# Patient Record
Sex: FEMALE | Race: WHITE | NOT HISPANIC OR LATINO | URBAN - NONMETROPOLITAN AREA
[De-identification: names, ages, dates, MRNs, and addresses within clinical notes are randomized per-mention and may not be internally consistent; named-entity substitution may affect disease eponyms.]

---

## 2017-01-12 ENCOUNTER — OFFICE VISIT (OUTPATIENT)
Dept: OBSTETRICS AND GYNECOLOGY | Facility: CLINIC | Age: 22
End: 2017-01-12

## 2017-01-12 VITALS
SYSTOLIC BLOOD PRESSURE: 112 MMHG | WEIGHT: 129 LBS | DIASTOLIC BLOOD PRESSURE: 60 MMHG | HEIGHT: 61 IN | BODY MASS INDEX: 24.35 KG/M2

## 2017-01-12 DIAGNOSIS — N94.6 DYSMENORRHEA: ICD-10-CM

## 2017-01-12 DIAGNOSIS — N92.0 MENORRHAGIA WITH REGULAR CYCLE: Primary | ICD-10-CM

## 2017-01-12 PROCEDURE — 99204 OFFICE O/P NEW MOD 45 MIN: CPT | Performed by: OBSTETRICS & GYNECOLOGY

## 2017-01-12 RX ORDER — NORETHINDRONE ACETATE AND ETHINYL ESTRADIOL AND FERROUS FUMARATE 1MG-20(24)
1 KIT ORAL DAILY
Qty: 28 TABLET | Refills: 11 | Status: SHIPPED | OUTPATIENT
Start: 2017-01-12 | End: 2017-01-13 | Stop reason: SDUPTHER

## 2017-01-12 RX ORDER — NAPROXEN SODIUM 275 MG/1
275 TABLET ORAL 2 TIMES DAILY WITH MEALS
Qty: 60 TABLET | Refills: 2 | Status: SHIPPED | OUTPATIENT
Start: 2017-01-12 | End: 2017-02-01

## 2017-01-12 RX ORDER — IBUPROFEN 600 MG/1
600 TABLET ORAL EVERY 6 HOURS PRN
Status: DISCONTINUED | OUTPATIENT
Start: 2017-01-12 | End: 2017-02-01

## 2017-01-12 RX ORDER — IBUPROFEN 600 MG/1
TABLET ORAL
Refills: 4 | COMMUNITY
Start: 2016-10-31 | End: 2017-01-13 | Stop reason: SDUPTHER

## 2017-01-12 RX ORDER — NAPROXEN 250 MG/1
TABLET ORAL
Refills: 3 | COMMUNITY
Start: 2016-12-13

## 2017-01-12 NOTE — MR AVS SNAPSHOT
Lyudmilaamor Warner   1/12/2017 1:30 PM   Office Visit    Dept Phone:  [phone]   Encounter #:  [id number]    Provider:  Nilsa Capellan MD   Department:  [facility] OBSTETRICS AND GYNECOLOGY                Your Full Care Plan              Today's Medication Changes          These changes are accurate as of: 1/12/17  2:24 PM.  If you have any questions, ask your nurse or doctor.               New Medication(s)Ordered:     naproxen sodium 275 MG tablet   Commonly known as:  ANAPROX   Take 1 tablet by mouth 2 (Two) Times a Day With Meals.   Started by:  Nilsa Capellan MD       Norethin Ace-Eth Estrad-FE 1-20 MG-MCG(24) chewable tablet   Chew 1 tablet Daily.   Started by:  Nilsa Capellan MD            Where to Get Your Medications      These medications were sent to [place], [place] - [address] [place] [address] Rd. - [phone]  - [phone]   [address] [place] [address] [place], [facility] [address]     Phone:  [phone]     naproxen sodium 275 MG tablet    Norethin Ace-Eth Estrad-FE 1-20 MG-MCG(24) chewable tablet                  Your Updated Medication List          This list is accurate as of: 1/12/17  2:24 PM.  Always use your most recent med list.                ibuprofen 600 MG tablet   Commonly known as:  ADVIL,MOTRIN       naproxen 250 MG tablet   Commonly known as:  NAPROSYN       naproxen sodium 275 MG tablet   Commonly known as:  ANAPROX   Take 1 tablet by mouth 2 (Two) Times a Day With Meals.       Norethin Ace-Eth Estrad-FE 1-20 MG-MCG(24) chewable tablet   Chew 1 tablet Daily.       SPRINTEC 28 0.25-35 MG-MCG per tablet   Generic drug:  norgestimate-ethinyl estradiol               You Were Diagnosed With        Codes Comments    Menorrhagia with regular cycle    -  Primary ICD-10-CM: N92.0  ICD-9-CM: 626.2     Dysmenorrhea     ICD-10-CM: N94.6  ICD-9-CM: 625.3       Medications to be Given to You by a Medical Professional     Due       Frequency    (none)  ibuprofen (ADVIL,MOTRIN) tablet 600 mg  Every 6 Hours PRN      Instructions    Menorrhagia  Menorrhagia is the medical term for when your menstrual periods are heavy or last longer than usual. With menorrhagia, every period you have may cause enough blood loss and cramping that you are unable to maintain your usual activities.  CAUSES   In some cases, the cause of heavy periods is unknown, but a number of conditions may cause menorrhagia. Common causes include:  · A problem with the hormone-producing thyroid gland (hypothyroid).  · Noncancerous growths in the uterus (polyps or fibroids).  · An imbalance of the estrogen and progesterone hormones.  · One of your ovaries not releasing an egg during one or more months.  · Side effects of having an intrauterine device (IUD).  · Side effects of some medicines, such as anti-inflammatory medicines or blood thinners.  · A bleeding disorder that stops your blood from clotting normally.  SIGNS AND SYMPTOMS   During a normal period, bleeding lasts between 4 and 8 days. Signs that your periods are too heavy include:  · You routinely have to change your pad or tampon every 1 or 2 hours because it is completely soaked.  · You pass blood clots larger than 1 inch (2.5 cm) in size.  · You have bleeding for more than 7 days.  · You need to use pads and tampons at the same time because of heavy bleeding.  · You need to wake up to change your pads or tampons during the night.  · You have symptoms of anemia, such as tiredness, fatigue, or shortness of breath.   DIAGNOSIS   Your health care provider will perform a physical exam and ask you questions about your symptoms and menstrual history. Other tests may be ordered based on what the health care provider finds during the exam. These tests can include:  · Blood tests. Blood tests are used to check if you are pregnant or have hormonal changes, a bleeding or thyroid disorder, low iron levels (anemia), or other problems.  · Endometrial  biopsy. Your health care provider takes a sample of tissue from the inside of your uterus to be examined under a microscope.  · Pelvic ultrasound. This test uses sound waves to make a picture of your uterus, ovaries, and vagina. The pictures can show if you have fibroids or other growths.  · Hysteroscopy. For this test, your health care provider will use a small telescope to look inside your uterus.  Based on the results of your initial tests, your health care provider may recommend further testing.  TREATMENT   Treatment may not be needed. If it is needed, your health care provider may recommend treatment with one or more medicines first. If these do not reduce bleeding enough, a surgical treatment might be an option. The best treatment for you will depend on:   · Whether you need to prevent pregnancy.    · Your desire to have children in the future.  · The cause and severity of your bleeding.  · Your opinion and personal preference.    Medicines for menorrhagia may include:  · Birth control methods that use hormones. These include the pill, skin patch, vaginal ring, shots that you get every 3 months, hormonal IUD, and implant. These treatments reduce bleeding during your menstrual period.  · Medicines that thicken blood and slow bleeding.  · Medicines that reduce swelling, such as ibuprofen.   · Medicines that contain a synthetic hormone called progestin.    · Medicines that make the ovaries stop working for a short time.    You may need surgical treatment for menorrhagia if the medicines are unsuccessful. Treatment options include:  · Dilation and curettage (D&C). In this procedure, your health care provider opens (dilates) your cervix and then scrapes or suctions tissue from the lining of your uterus to reduce menstrual bleeding.  · Operative hysteroscopy. This procedure uses a tiny tube with a light (hysteroscope) to view your uterine cavity and can help in the surgical removal of a polyp that may be causing  heavy periods.  · Endometrial ablation. Through various techniques, your health care provider permanently destroys the entire lining of your uterus (endometrium). After endometrial ablation, most women have little or no menstrual flow. Endometrial ablation reduces your ability to become pregnant.  · Endometrial resection. This surgical procedure uses an electrosurgical wire loop to remove the lining of the uterus. This procedure also reduces your ability to become pregnant.  · Hysterectomy. Surgical removal of the uterus and cervix is a permanent procedure that stops menstrual periods. Pregnancy is not possible after a hysterectomy. This procedure requires anesthesia and hospitalization.  HOME CARE INSTRUCTIONS   · Only take over-the-counter or prescription medicines as directed by your health care provider. Take prescribed medicines exactly as directed. Do not change or switch medicines without consulting your health care provider.  · Take any prescribed iron pills exactly as directed by your health care provider. Long-term heavy bleeding may result in low iron levels. Iron pills help replace the iron your body lost from heavy bleeding. Iron may cause constipation. If this becomes a problem, increase the bran, fruits, and roughage in your diet.  · Do not take aspirin or medicines that contain aspirin 1 week before or during your menstrual period. Aspirin may make the bleeding worse.  · If you need to change your sanitary pad or tampon more than once every 2 hours, stay in bed and rest as much as possible until the bleeding stops.  · Eat well-balanced meals. Eat foods high in iron. Examples are leafy green vegetables, meat, liver, eggs, and whole grain breads and cereals. Do not try to lose weight until the abnormal bleeding has stopped and your blood iron level is back to normal.  SEEK MEDICAL CARE IF:   · You soak through a pad or tampon every 1 or 2 hours, and this happens every time you have a period.  · You  need to use pads and tampons at the same time because you are bleeding so much.  · You need to change your pad or tampon during the night.  · You have a period that lasts for more than 8 days.  · You pass clots bigger than 1 inch wide.  · You have irregular periods that happen more or less often than once a month.  · You feel dizzy or faint.  · You feel very weak or tired.  · You feel short of breath or feel your heart is beating too fast when you exercise.  · You have nausea and vomiting or diarrhea while you are taking your medicine.  · You have any problems that may be related to the medicine you are taking.  SEEK IMMEDIATE MEDICAL CARE IF:   · You soak through 4 or more pads or tampons in 2 hours.  · You have any bleeding while you are pregnant.  MAKE SURE YOU:   · Understand these instructions.  · Will watch your condition.  · Will get help right away if you are not doing well or get worse.     This information is not intended to replace advice given to you by your health care provider. Make sure you discuss any questions you have with your health care provider.     Document Released: 2006 Document Revised: 2014 Document Reviewed: 2014  [employer] Interactive Patient Education ©6 [employer].       Patient Instructions History      Upcoming Appointments     Visit Type Date Time Department    ANNUAL 2017  1:30 PM [facility]    NEW PATIENT 2017 10:30 AM [facility]    GYN FOLLOW UP 2017  2:00 PM [facility]      [employer] Signup     [facility] [employer] allows you to send messages to your doctor, view your test results, renew your prescriptions, schedule appointments, and more. To sign up, go to [employer] and click on the Sign Up Now link in the New User? box. Enter your [employer] Activation Code exactly as it appears below along with the last four digits of your Social Security Number and your Date of Birth () to complete the sign-up process.  "If you do not sign up before the expiration date, you must request a new code.    [email or web address] Activation Code: [email or web address]  Expires: 1/26/2017  2:24 PM    If you have questions, you can email [email or web address] or call [phone] to talk to our [employer] staff. Remember, [employer] is NOT to be used for urgent needs. For medical emergencies, dial 911.               Other Info from Your Visit           Your Appointments     Feb 01, 2017 10:30 AM EST   New Patient with Filippo Blevins MD   [facility] FAMILY MEDICINE (--)    [address]  [facility] [address] [phone]           Bring all previous medical records and films, along with current medications and insurance information.            May 12, 2017  2:00 PM EDT   GYN FOLLOW UP with Nilsa Capellan MD   [facility] OBSTETRICS AND GYNECOLOGY (--)    [address]  [facility] [address] [phone]              Allergies     Cefzil [Cefprozil]      Other      omnicef      Reason for Visit     Contraception           Vital Signs     Blood Pressure Height Weight Last Menstrual Period Breastfeeding? Body Mass Index    112/60 61\" (154.9 cm) 129 lb (58.5 kg) 01/04/2017 No 24.37 kg/m2      Problems and Diagnoses Noted     Menorrhagia with regular cycle    -  Primary    Painful menstruation            "

## 2017-01-12 NOTE — PATIENT INSTRUCTIONS
Menorrhagia  Menorrhagia is the medical term for when your menstrual periods are heavy or last longer than usual. With menorrhagia, every period you have may cause enough blood loss and cramping that you are unable to maintain your usual activities.  CAUSES   In some cases, the cause of heavy periods is unknown, but a number of conditions may cause menorrhagia. Common causes include:  · A problem with the hormone-producing thyroid gland (hypothyroid).  · Noncancerous growths in the uterus (polyps or fibroids).  · An imbalance of the estrogen and progesterone hormones.  · One of your ovaries not releasing an egg during one or more months.  · Side effects of having an intrauterine device (IUD).  · Side effects of some medicines, such as anti-inflammatory medicines or blood thinners.  · A bleeding disorder that stops your blood from clotting normally.  SIGNS AND SYMPTOMS   During a normal period, bleeding lasts between 4 and 8 days. Signs that your periods are too heavy include:  · You routinely have to change your pad or tampon every 1 or 2 hours because it is completely soaked.  · You pass blood clots larger than 1 inch (2.5 cm) in size.  · You have bleeding for more than 7 days.  · You need to use pads and tampons at the same time because of heavy bleeding.  · You need to wake up to change your pads or tampons during the night.  · You have symptoms of anemia, such as tiredness, fatigue, or shortness of breath.   DIAGNOSIS   Your health care provider will perform a physical exam and ask you questions about your symptoms and menstrual history. Other tests may be ordered based on what the health care provider finds during the exam. These tests can include:  · Blood tests. Blood tests are used to check if you are pregnant or have hormonal changes, a bleeding or thyroid disorder, low iron levels (anemia), or other problems.  · Endometrial biopsy. Your health care provider takes a sample of tissue from the inside of your  uterus to be examined under a microscope.  · Pelvic ultrasound. This test uses sound waves to make a picture of your uterus, ovaries, and vagina. The pictures can show if you have fibroids or other growths.  · Hysteroscopy. For this test, your health care provider will use a small telescope to look inside your uterus.  Based on the results of your initial tests, your health care provider may recommend further testing.  TREATMENT   Treatment may not be needed. If it is needed, your health care provider may recommend treatment with one or more medicines first. If these do not reduce bleeding enough, a surgical treatment might be an option. The best treatment for you will depend on:   · Whether you need to prevent pregnancy.    · Your desire to have children in the future.  · The cause and severity of your bleeding.  · Your opinion and personal preference.    Medicines for menorrhagia may include:  · Birth control methods that use hormones. These include the pill, skin patch, vaginal ring, shots that you get every 3 months, hormonal IUD, and implant. These treatments reduce bleeding during your menstrual period.  · Medicines that thicken blood and slow bleeding.  · Medicines that reduce swelling, such as ibuprofen.   · Medicines that contain a synthetic hormone called progestin.    · Medicines that make the ovaries stop working for a short time.    You may need surgical treatment for menorrhagia if the medicines are unsuccessful. Treatment options include:  · Dilation and curettage (D&C). In this procedure, your health care provider opens (dilates) your cervix and then scrapes or suctions tissue from the lining of your uterus to reduce menstrual bleeding.  · Operative hysteroscopy. This procedure uses a tiny tube with a light (hysteroscope) to view your uterine cavity and can help in the surgical removal of a polyp that may be causing heavy periods.  · Endometrial ablation. Through various techniques, your health care  provider permanently destroys the entire lining of your uterus (endometrium). After endometrial ablation, most women have little or no menstrual flow. Endometrial ablation reduces your ability to become pregnant.  · Endometrial resection. This surgical procedure uses an electrosurgical wire loop to remove the lining of the uterus. This procedure also reduces your ability to become pregnant.  · Hysterectomy. Surgical removal of the uterus and cervix is a permanent procedure that stops menstrual periods. Pregnancy is not possible after a hysterectomy. This procedure requires anesthesia and hospitalization.  HOME CARE INSTRUCTIONS   · Only take over-the-counter or prescription medicines as directed by your health care provider. Take prescribed medicines exactly as directed. Do not change or switch medicines without consulting your health care provider.  · Take any prescribed iron pills exactly as directed by your health care provider. Long-term heavy bleeding may result in low iron levels. Iron pills help replace the iron your body lost from heavy bleeding. Iron may cause constipation. If this becomes a problem, increase the bran, fruits, and roughage in your diet.  · Do not take aspirin or medicines that contain aspirin 1 week before or during your menstrual period. Aspirin may make the bleeding worse.  · If you need to change your sanitary pad or tampon more than once every 2 hours, stay in bed and rest as much as possible until the bleeding stops.  · Eat well-balanced meals. Eat foods high in iron. Examples are leafy green vegetables, meat, liver, eggs, and whole grain breads and cereals. Do not try to lose weight until the abnormal bleeding has stopped and your blood iron level is back to normal.  SEEK MEDICAL CARE IF:   · You soak through a pad or tampon every 1 or 2 hours, and this happens every time you have a period.  · You need to use pads and tampons at the same time because you are bleeding so much.  · You  need to change your pad or tampon during the night.  · You have a period that lasts for more than 8 days.  · You pass clots bigger than 1 inch wide.  · You have irregular periods that happen more or less often than once a month.  · You feel dizzy or faint.  · You feel very weak or tired.  · You feel short of breath or feel your heart is beating too fast when you exercise.  · You have nausea and vomiting or diarrhea while you are taking your medicine.  · You have any problems that may be related to the medicine you are taking.  SEEK IMMEDIATE MEDICAL CARE IF:   · You soak through 4 or more pads or tampons in 2 hours.  · You have any bleeding while you are pregnant.  MAKE SURE YOU:   · Understand these instructions.  · Will watch your condition.  · Will get help right away if you are not doing well or get worse.     This information is not intended to replace advice given to you by your health care provider. Make sure you discuss any questions you have with your health care provider.     Document Released: 12/18/2006 Document Revised: 12/23/2014 Document Reviewed: 06/08/2014  [employer] Interactive Patient Education ©2016 [employer]

## 2017-01-12 NOTE — PROGRESS NOTES
17    Lyudmila Warner    1995      Chief Complaint   Patient presents with   • Contraception       Patient is 21 y.o.  here for follow-up regarding her menorrhagia as well as severe dysmenorrhea.  The patient is currently on Sprintec.  She has been on Sprintec for the last 1-1/2 years.  The patient had previously been on Ortho Tri-Cyclen with breakthrough bleeding as well as alopecia.  She was switched to Sprintec as noted above.  Patient reports menses are regular however still heavy in nature changing a pad every 2 hours.  She reports cycles are lasting 5 days.  Patient reports significant back pain associated with her menses with cramping.  The patient alternates ibuprofen and naproxen for her pain.  The patient denies any sexual activity.  He denies any vaginal discharge, itching, or burning.  The patient is at 21 years of age however she refuses a Pap smear and pelvic examination today.  The current guidelines and recommendations have been reviewed with both the patient and her mother today.  The patient also reports recent onset of anxiety with nervousness and irritability.  Patient feels it may be related to her course work and school load.  The patient no longer sees the pediatrician.  She does not have a family practitioner at this time.      History reviewed. No pertinent past medical history.        No current outpatient prescriptions on file prior to visit.     No current facility-administered medications on file prior to visit.          History reviewed. No pertinent past surgical history.        Social History     Social History   • Marital status: Single     Spouse name: N/A   • Number of children: N/A   • Years of education: N/A     Social History Main Topics   • Smoking status: None   • Smokeless tobacco: None   • Alcohol use None   • Drug use: None   • Sexual activity: Not Asked     Other Topics Concern   • None     Social History Narrative   • None         Review of Systems  "  Constitutional: Negative.    HENT: Negative.    Eyes: Negative.    Respiratory: Negative.    Cardiovascular: Negative.    Gastrointestinal: Negative.    Endocrine: Negative.    Genitourinary: Positive for menstrual problem and pelvic pain.   Musculoskeletal: Negative.    Allergic/Immunologic: Negative.    Neurological: Negative.    Hematological: Negative.    Psychiatric/Behavioral: The patient is nervous/anxious.                  Vitals:    01/12/17 1355   BP: 112/60   Weight: 129 lb (58.5 kg)   Height: 61\" (154.9 cm)       Physical Exam   Constitutional: She is oriented to person, place, and time. She appears well-developed and well-nourished. No distress.   HENT:   Head: Normocephalic and atraumatic.   Eyes: Conjunctivae are normal.   Neck: Normal range of motion. Neck supple. No thyromegaly present.   Cardiovascular: Normal rate, regular rhythm and normal heart sounds.    Pulmonary/Chest: Effort normal and breath sounds normal.   Abdominal: Soft. Bowel sounds are normal. She exhibits no distension. There is no tenderness. There is no rebound and no guarding.   Genitourinary:   Genitourinary Comments: Patient declines examination.   Neurological: She is alert and oriented to person, place, and time.   Skin: Skin is warm and dry. She is not diaphoretic.   Psychiatric: She has a normal mood and affect. Her behavior is normal.   Nursing note and vitals reviewed.        1. Menorrhagia with regular cycle  Patient with a history of menorrhagia and severe dysmenorrhea.  Patient has seen minimal improvement with her current oral contraceptives.  We'll plan a change on oral contraceptives as noted below.  The patient is to continue ibuprofen with naproxen alternating medication.  The patient will follow up in 3-4 months after 3 month trial of her new oral contraceptive.  The patient understands she will need her annual examination at that time.  She also understands the need for further evaluation if no improvement of " her symptoms.  - ibuprofen (ADVIL,MOTRIN) tablet 600 mg; Take 1 tablet by mouth Every 6 (Six) Hours As Needed for mild pain (1-3).    2. Dysmenorrhea  As noted above.  - ibuprofen (ADVIL,MOTRIN) tablet 600 mg; Take 1 tablet by mouth Every 6 (Six) Hours As Needed for mild pain (1-3).      Requested Prescriptions     Signed Prescriptions Disp Refills   • Norethin Ace-Eth Estrad-FE 1-20 MG-MCG(24) chewable tablet 28 tablet 11     Sig: Chew 1 tablet Daily.   • naproxen sodium (ANAPROX) 275 MG tablet 60 tablet 2     Sig: Take 1 tablet by mouth 2 (Two) Times a Day With Meals.       Return in about 4 months (around 5/12/2017).    Nilsa Capellan MD

## 2017-01-13 ENCOUNTER — TELEPHONE (OUTPATIENT)
Dept: OBSTETRICS AND GYNECOLOGY | Facility: CLINIC | Age: 22
End: 2017-01-13

## 2017-01-13 RX ORDER — IBUPROFEN 600 MG/1
600 TABLET ORAL EVERY 6 HOURS PRN
Qty: 30 TABLET | Refills: 0 | Status: SHIPPED | OUTPATIENT
Start: 2017-01-13 | End: 2017-02-12

## 2017-01-13 RX ORDER — NORETHINDRONE ACETATE AND ETHINYL ESTRADIOL AND FERROUS FUMARATE 1MG-20(24)
1 KIT ORAL DAILY
Qty: 28 TABLET | Refills: 11 | Status: SHIPPED | OUTPATIENT
Start: 2017-01-13 | End: 2017-02-01

## 2017-01-13 NOTE — TELEPHONE ENCOUNTER
----- Message from Chelo Christopher sent at 1/13/2017  8:56 AM EST -----  Contact: pharmacy  Minastrin wasn't covered by pt's insurance.  Dr Capellan said it's ok to send in Lomedia or [facility].  [facility].  thanks

## 2017-02-01 ENCOUNTER — OFFICE VISIT (OUTPATIENT)
Dept: FAMILY MEDICINE CLINIC | Facility: CLINIC | Age: 22
End: 2017-02-01

## 2017-02-01 VITALS
DIASTOLIC BLOOD PRESSURE: 78 MMHG | BODY MASS INDEX: 23.92 KG/M2 | SYSTOLIC BLOOD PRESSURE: 100 MMHG | OXYGEN SATURATION: 98 % | HEART RATE: 64 BPM | HEIGHT: 62 IN | WEIGHT: 130 LBS | TEMPERATURE: 97.8 F

## 2017-02-01 DIAGNOSIS — R41.840 ATTENTION AND CONCENTRATION DEFICIT: Primary | ICD-10-CM

## 2017-02-01 DIAGNOSIS — F41.9 ANXIETY: ICD-10-CM

## 2017-02-01 DIAGNOSIS — N94.6 DYSMENORRHEA: ICD-10-CM

## 2017-02-01 PROCEDURE — 99203 OFFICE O/P NEW LOW 30 MIN: CPT | Performed by: FAMILY MEDICINE

## 2017-02-01 RX ORDER — MEFENAMIC ACID 250 MG/1
1 CAPSULE ORAL EVERY 6 HOURS PRN
Qty: 20 EACH | Refills: 5 | Status: CANCELLED | OUTPATIENT
Start: 2017-02-01

## 2017-02-01 NOTE — PROGRESS NOTES
Subjective   Lyudmila Warner is a 21 y.o. female    HPI Comments: New patient presents to this practice to establish care.  [place] PCP [place]. israel [place] [facility] [place]  Patient is accompanied by her mother.  Her primary concern is difficulty concentrating.  She attends college at the  part-time and works full-time at a retail store in [employer].  According to the patient's mother patient was on ADD medicine in her younger years but was weaned off.  She also feels her daughter suffers with anxiety.  She also reports that she has dysmenorrhea.  She sees a gynecologist who recently changed her oral contraceptive but they have not yet filled the new prescription.  No prior medical records available for confirmation of previous ADD treatment.    Dysmenorrhea   Pertinent negatives include no chest pain or fatigue.       The following portions of the patient's history were reviewed and updated as appropriate: allergies, current medications, past social history and problem list    Review of Systems   Constitutional: Negative for activity change, appetite change, fatigue and unexpected weight change.   Respiratory: Negative.  Negative for chest tightness.    Cardiovascular: Negative for chest pain and palpitations.   Gastrointestinal: Negative.    Genitourinary: Positive for menstrual problem. Negative for dysuria and frequency.   Musculoskeletal: Negative.    Skin: Negative.    Neurological: Negative.    Hematological: Negative.    Psychiatric/Behavioral: Negative for agitation, behavioral problems, confusion, decreased concentration, dysphoric mood and sleep disturbance. The patient is nervous/anxious. The patient is not hyperactive.        Objective     Vitals:    02/01/17 1039   BP: 100/78   Pulse: 64   Temp: 97.8 °F (36.6 °C)   SpO2: 98%       Physical Exam   Constitutional: She appears well-developed and well-nourished.   HENT:   Head: Normocephalic and atraumatic.   Eyes: Conjunctivae are normal. Pupils are  equal, round, and reactive to light.   Cardiovascular: Normal rate and regular rhythm.    Pulmonary/Chest: Effort normal and breath sounds normal.   Psychiatric: Her speech is normal and behavior is normal. Her mood appears anxious.   Mother dominates conversation during visit   Nursing note and vitals reviewed.      Assessment/Plan   Problem List Items Addressed This Visit        Other    Dysmenorrhea      Other Visit Diagnoses     Attention and concentration deficit    -  Primary    Relevant Orders    Ambulatory Referral to Psychology (Completed)    Anxiety            I asked the patient and her mother to stop at the checkout desk to facilitate referral for psychological testing.  They did not stop to make these arrangements.

## 2017-03-13 ENCOUNTER — TELEPHONE (OUTPATIENT)
Dept: OBSTETRICS AND GYNECOLOGY | Facility: CLINIC | Age: 22
End: 2017-03-13

## 2017-03-13 RX ORDER — IBUPROFEN 600 MG/1
600 TABLET ORAL EVERY 6 HOURS PRN
Qty: 60 TABLET | Refills: 0 | Status: SHIPPED | OUTPATIENT
Start: 2017-03-13 | End: 2017-03-28 | Stop reason: SDUPTHER

## 2017-03-13 NOTE — TELEPHONE ENCOUNTER
----- Message from Chelo Christopher sent at 3/10/2017  3:40 PM EST -----  Contact: PT  THIS IS DR LO'S PT.  SHE CALLED REQUESTING REFILLS ON IBUPROFEN 600MG TO BE SENT TO [facility].  THANKS

## 2017-03-28 RX ORDER — IBUPROFEN 600 MG/1
600 TABLET ORAL EVERY 6 HOURS PRN
Qty: 60 TABLET | Refills: 11 | Status: SHIPPED | OUTPATIENT
Start: 2017-03-28

## 2018-01-04 ENCOUNTER — TELEPHONE (OUTPATIENT)
Dept: OBSTETRICS AND GYNECOLOGY | Facility: CLINIC | Age: 23
End: 2018-01-04

## 2018-01-04 NOTE — TELEPHONE ENCOUNTER
----- Message from Chelo Christopher sent at 1/4/2018 10:09 AM EST -----  Contact: PT  THIS IS DR SIMENTAL/DR LO'S PT.  SHE CALLED STATING SHE CUT HERSELF SHAVING IN THE VAGINAL AREA.  SHE IS REQUESTING RX FOR LOTRISONE CREAM AND SAID WE HAD PRESCRIBED A PILL WITH IT LAST TIME, BUT SHE DOESN'T REMEMBER THE NAME.  I COULDN'T FIND IT IN HER CHART.  SHE USES [facility].  THANKS

## 2018-01-05 RX ORDER — METRONIDAZOLE 500 MG/1
500 TABLET ORAL 2 TIMES DAILY
Qty: 14 TABLET | Refills: 0 | Status: SHIPPED | OUTPATIENT
Start: 2018-01-05 | End: 2018-01-12

## 2018-01-05 NOTE — TELEPHONE ENCOUNTER
Dr. Olivo,   Patient called back and advised she seen you a couple years ago and had cut herself shaving and you prescribed Flagyl for her and it helped heal?? Patient is now requesting a prescripton for that.